# Patient Record
Sex: FEMALE | Race: OTHER | Employment: STUDENT | URBAN - METROPOLITAN AREA
[De-identification: names, ages, dates, MRNs, and addresses within clinical notes are randomized per-mention and may not be internally consistent; named-entity substitution may affect disease eponyms.]

---

## 2017-10-27 ENCOUNTER — OFFICE VISIT (OUTPATIENT)
Dept: FAMILY MEDICINE CLINIC | Facility: CLINIC | Age: 22
End: 2017-10-27

## 2017-10-27 ENCOUNTER — APPOINTMENT (OUTPATIENT)
Dept: LAB | Age: 22
End: 2017-10-27
Attending: PHYSICIAN ASSISTANT
Payer: COMMERCIAL

## 2017-10-27 VITALS
BODY MASS INDEX: 25.76 KG/M2 | HEIGHT: 62 IN | SYSTOLIC BLOOD PRESSURE: 104 MMHG | TEMPERATURE: 98 F | WEIGHT: 140 LBS | HEART RATE: 86 BPM | DIASTOLIC BLOOD PRESSURE: 68 MMHG

## 2017-10-27 DIAGNOSIS — Z23 NEED FOR PROPHYLACTIC VACCINATION AGAINST DIPHTHERIA-TETANUS-PERTUSSIS (DTP): ICD-10-CM

## 2017-10-27 DIAGNOSIS — Z00.00 ROUTINE GENERAL MEDICAL EXAMINATION AT A HEALTH CARE FACILITY: ICD-10-CM

## 2017-10-27 DIAGNOSIS — Z00.00 ROUTINE GENERAL MEDICAL EXAMINATION AT A HEALTH CARE FACILITY: Primary | ICD-10-CM

## 2017-10-27 DIAGNOSIS — Z11.1 SCREENING FOR TUBERCULOSIS: ICD-10-CM

## 2017-10-27 DIAGNOSIS — Z23 NEED FOR PROPHYLACTIC VACCINATION AND INOCULATION AGAINST INFLUENZA: ICD-10-CM

## 2017-10-27 PROCEDURE — 86480 TB TEST CELL IMMUN MEASURE: CPT

## 2017-10-27 PROCEDURE — 86735 MUMPS ANTIBODY: CPT

## 2017-10-27 PROCEDURE — 86787 VARICELLA-ZOSTER ANTIBODY: CPT

## 2017-10-27 PROCEDURE — 90471 IMMUNIZATION ADMIN: CPT | Performed by: PHYSICIAN ASSISTANT

## 2017-10-27 PROCEDURE — 86762 RUBELLA ANTIBODY: CPT

## 2017-10-27 PROCEDURE — 90686 IIV4 VACC NO PRSV 0.5 ML IM: CPT | Performed by: PHYSICIAN ASSISTANT

## 2017-10-27 PROCEDURE — 90472 IMMUNIZATION ADMIN EACH ADD: CPT | Performed by: PHYSICIAN ASSISTANT

## 2017-10-27 PROCEDURE — 90715 TDAP VACCINE 7 YRS/> IM: CPT | Performed by: PHYSICIAN ASSISTANT

## 2017-10-27 PROCEDURE — 86765 RUBEOLA ANTIBODY: CPT

## 2017-10-27 PROCEDURE — 36415 COLL VENOUS BLD VENIPUNCTURE: CPT

## 2017-10-27 PROCEDURE — 99395 PREV VISIT EST AGE 18-39: CPT | Performed by: PHYSICIAN ASSISTANT

## 2017-10-27 PROCEDURE — 86706 HEP B SURFACE ANTIBODY: CPT

## 2017-10-27 NOTE — PROGRESS NOTES
HPI:   Ovi Tena is a 25year old female who presents for physical exam for [facility] respiratory therapy program.  She will need titer levels drawn and Quantiferon Tb testing, flu shot and Tdap.   She denies any acute problems or concerns tingling in the extremities,change in bowel or bladder control. HEMATOLOGIC:  Denies anemia, bleeding or bruising. LYMPHATICS:  Denies enlarged nodes or history of splenectomy.   ENDOCRINOLOGIC:  Denies excessive sweating, cold or heat intolerance, polyur HEPATITIS B SURFACE ANTIBODY;  Future    Need for prophylactic vaccination and inoculation against influenza  -Flu shot administered today.  -     FLULAVAL INFLUENZA VACCINE QUAD PRESERVATIVE FREE 0.5 ML    Need for prophylactic vaccination against diphther

## 2017-11-02 ENCOUNTER — NURSE ONLY (OUTPATIENT)
Dept: FAMILY MEDICINE CLINIC | Facility: CLINIC | Age: 22
End: 2017-11-02

## 2017-11-02 DIAGNOSIS — Z23 NEED FOR MMR VACCINE: Primary | ICD-10-CM

## 2017-11-02 PROCEDURE — 90707 MMR VACCINE SC: CPT | Performed by: FAMILY MEDICINE

## 2017-11-02 PROCEDURE — 90471 IMMUNIZATION ADMIN: CPT | Performed by: FAMILY MEDICINE

## 2017-11-21 ENCOUNTER — APPOINTMENT (OUTPATIENT)
Dept: OTHER | Facility: HOSPITAL | Age: 22
End: 2017-11-21
Attending: PREVENTIVE MEDICINE

## 2017-12-04 ENCOUNTER — TELEPHONE (OUTPATIENT)
Dept: FAMILY MEDICINE CLINIC | Facility: CLINIC | Age: 22
End: 2017-12-04

## 2017-12-04 ENCOUNTER — OCC HEALTH (OUTPATIENT)
Dept: OTHER | Facility: HOSPITAL | Age: 22
End: 2017-12-04
Attending: PREVENTIVE MEDICINE

## 2017-12-04 DIAGNOSIS — Z01.84 IMMUNITY STATUS TESTING: Primary | ICD-10-CM

## 2017-12-04 PROCEDURE — 86765 RUBEOLA ANTIBODY: CPT

## 2017-12-04 NOTE — TELEPHONE ENCOUNTER
Pt is scheduled for a 1pm nurse visit at [facility] location for rubeola titers. No nurse visit needed to this. Labs are not order yet. Is this pt only suppose to get labs done  ?    Please advise

## 2017-12-05 NOTE — TELEPHONE ENCOUNTER
Yes, I was out of the office yesterday. Patient had MMR booster and did need rubeola titer repeated. It appears she did have this done and results pending. Thanks!

## 2019-12-05 ENCOUNTER — OFFICE VISIT (OUTPATIENT)
Dept: FAMILY MEDICINE CLINIC | Facility: CLINIC | Age: 24
End: 2019-12-05
Payer: COMMERCIAL

## 2019-12-05 VITALS
SYSTOLIC BLOOD PRESSURE: 119 MMHG | TEMPERATURE: 100 F | BODY MASS INDEX: 26 KG/M2 | DIASTOLIC BLOOD PRESSURE: 76 MMHG | HEART RATE: 86 BPM | WEIGHT: 144 LBS

## 2019-12-05 DIAGNOSIS — J02.9 SORE THROAT: ICD-10-CM

## 2019-12-05 DIAGNOSIS — B34.9 ACUTE VIRAL SYNDROME: Primary | ICD-10-CM

## 2019-12-05 PROCEDURE — 99213 OFFICE O/P EST LOW 20 MIN: CPT | Performed by: FAMILY MEDICINE

## 2019-12-05 PROCEDURE — 87880 STREP A ASSAY W/OPTIC: CPT | Performed by: FAMILY MEDICINE

## 2019-12-05 RX ORDER — BENZONATATE 200 MG/1
200 CAPSULE ORAL 3 TIMES DAILY PRN
Qty: 21 CAPSULE | Refills: 0 | Status: SHIPPED | OUTPATIENT
Start: 2019-12-05

## 2019-12-05 NOTE — PROGRESS NOTES
2019 2:49 PM    Allen Franklin, : 1995  Patient presents with:  Sore Throat: x 3 days, body aches, headache, cough, sore throat, ear poping, sneezing,     HPI:     Allen Reid is a 22year old female who presents for evaluation of past surgical history on file.     Social History: Social History    Socioeconomic History      Marital status: Unknown      Spouse name: Not on file      Number of children: Not on file      Years of education: Not on file      Highest education level: Not Examination:    /76 (BP Location: Right arm, Patient Position: Sitting, Cuff Size: adult)   Pulse 86   Temp 99.7 °F (37.6 °C) (Oral)   Wt 144 lb (65.3 kg)   LMP 11/05/2019 (Within Weeks)   Breastfeeding No   BMI 26.34 kg/m²     GENERAL: well develope This Encounter      POC Rapid Strep [04603]      benzonatate 200 MG Oral Cap          Sig: Take 1 capsule (200 mg total) by mouth 3 (three) times daily as needed for cough.           Dispense:  21 capsule          Refill:  0      Follow Up with:  No follow-